# Patient Record
Sex: MALE | Race: BLACK OR AFRICAN AMERICAN | NOT HISPANIC OR LATINO | URBAN - METROPOLITAN AREA
[De-identification: names, ages, dates, MRNs, and addresses within clinical notes are randomized per-mention and may not be internally consistent; named-entity substitution may affect disease eponyms.]

---

## 2019-05-08 ENCOUNTER — EMERGENCY (EMERGENCY)
Facility: HOSPITAL | Age: 44
LOS: 1 days | Discharge: HOME | End: 2019-05-08
Attending: EMERGENCY MEDICINE
Payer: SELF-PAY

## 2019-05-08 VITALS
TEMPERATURE: 99 F | SYSTOLIC BLOOD PRESSURE: 168 MMHG | OXYGEN SATURATION: 94 % | DIASTOLIC BLOOD PRESSURE: 88 MMHG | RESPIRATION RATE: 18 BRPM | HEART RATE: 104 BPM

## 2019-05-08 VITALS — HEIGHT: 69 IN | WEIGHT: 289.91 LBS

## 2019-05-08 DIAGNOSIS — S82.90XA UNSPECIFIED FRACTURE OF UNSPECIFIED LOWER LEG, INITIAL ENCOUNTER FOR CLOSED FRACTURE: Chronic | ICD-10-CM

## 2019-05-08 DIAGNOSIS — T14.8XXA OTHER INJURY OF UNSPECIFIED BODY REGION, INITIAL ENCOUNTER: ICD-10-CM

## 2019-05-08 LAB
ALBUMIN SERPL ELPH-MCNC: 4 G/DL — SIGNIFICANT CHANGE UP (ref 3.3–5.2)
ALP SERPL-CCNC: 64 U/L — SIGNIFICANT CHANGE UP (ref 40–120)
ALT FLD-CCNC: 23 U/L — SIGNIFICANT CHANGE UP
ANION GAP SERPL CALC-SCNC: 14 MMOL/L — SIGNIFICANT CHANGE UP (ref 5–17)
APTT BLD: 28.2 SEC — SIGNIFICANT CHANGE UP (ref 27.5–36.3)
AST SERPL-CCNC: 23 U/L — SIGNIFICANT CHANGE UP
BASE EXCESS BLDV CALC-SCNC: 4.8 MMOL/L — HIGH (ref -2–2)
BASOPHILS # BLD AUTO: 0 K/UL — SIGNIFICANT CHANGE UP (ref 0–0.2)
BASOPHILS NFR BLD AUTO: 0.3 % — SIGNIFICANT CHANGE UP (ref 0–2)
BILIRUB SERPL-MCNC: 0.3 MG/DL — LOW (ref 0.4–2)
BUN SERPL-MCNC: 17 MG/DL — SIGNIFICANT CHANGE UP (ref 8–20)
CA-I SERPL-SCNC: 1.15 MMOL/L — SIGNIFICANT CHANGE UP (ref 1.15–1.33)
CALCIUM SERPL-MCNC: 9.6 MG/DL — SIGNIFICANT CHANGE UP (ref 8.6–10.2)
CHLORIDE BLDV-SCNC: 103 MMOL/L — SIGNIFICANT CHANGE UP (ref 98–107)
CHLORIDE SERPL-SCNC: 100 MMOL/L — SIGNIFICANT CHANGE UP (ref 98–107)
CO2 SERPL-SCNC: 25 MMOL/L — SIGNIFICANT CHANGE UP (ref 22–29)
CREAT SERPL-MCNC: 1.29 MG/DL — SIGNIFICANT CHANGE UP (ref 0.5–1.3)
EOSINOPHIL # BLD AUTO: 0.3 K/UL — SIGNIFICANT CHANGE UP (ref 0–0.5)
EOSINOPHIL NFR BLD AUTO: 2.8 % — SIGNIFICANT CHANGE UP (ref 0–6)
ETHANOL SERPL-MCNC: <10 MG/DL — SIGNIFICANT CHANGE UP
GAS PNL BLDV: 142 MMOL/L — SIGNIFICANT CHANGE UP (ref 135–145)
GAS PNL BLDV: SIGNIFICANT CHANGE UP
GAS PNL BLDV: SIGNIFICANT CHANGE UP
GLUCOSE BLDV-MCNC: 128 MG/DL — HIGH (ref 70–99)
GLUCOSE SERPL-MCNC: 136 MG/DL — HIGH (ref 70–115)
HCO3 BLDV-SCNC: 28 MMOL/L — SIGNIFICANT CHANGE UP (ref 21–29)
HCT VFR BLD CALC: 44.9 % — SIGNIFICANT CHANGE UP (ref 42–52)
HCT VFR BLDA CALC: 48 — SIGNIFICANT CHANGE UP (ref 39–50)
HGB BLD CALC-MCNC: 15.6 G/DL — SIGNIFICANT CHANGE UP (ref 13–17)
HGB BLD-MCNC: 15.4 G/DL — SIGNIFICANT CHANGE UP (ref 14–18)
INR BLD: 0.89 RATIO — SIGNIFICANT CHANGE UP (ref 0.88–1.16)
LACTATE BLDV-MCNC: 1.5 MMOL/L — SIGNIFICANT CHANGE UP (ref 0.5–2)
LIDOCAIN IGE QN: 29 U/L — SIGNIFICANT CHANGE UP (ref 22–51)
LYMPHOCYTES # BLD AUTO: 3.8 K/UL — SIGNIFICANT CHANGE UP (ref 1–4.8)
LYMPHOCYTES # BLD AUTO: 39.7 % — SIGNIFICANT CHANGE UP (ref 20–55)
MCHC RBC-ENTMCNC: 28.6 PG — SIGNIFICANT CHANGE UP (ref 27–31)
MCHC RBC-ENTMCNC: 34.3 G/DL — SIGNIFICANT CHANGE UP (ref 32–36)
MCV RBC AUTO: 83.5 FL — SIGNIFICANT CHANGE UP (ref 80–94)
MONOCYTES # BLD AUTO: 0.8 K/UL — SIGNIFICANT CHANGE UP (ref 0–0.8)
MONOCYTES NFR BLD AUTO: 8.4 % — SIGNIFICANT CHANGE UP (ref 3–10)
NEUTROPHILS # BLD AUTO: 4.6 K/UL — SIGNIFICANT CHANGE UP (ref 1.8–8)
NEUTROPHILS NFR BLD AUTO: 48.6 % — SIGNIFICANT CHANGE UP (ref 37–73)
OTHER CELLS CSF MANUAL: 19 ML/DL — SIGNIFICANT CHANGE UP (ref 18–22)
PCO2 BLDV: 50 MMHG — SIGNIFICANT CHANGE UP (ref 35–50)
PH BLDV: 7.4 — SIGNIFICANT CHANGE UP (ref 7.32–7.43)
PLATELET # BLD AUTO: 183 K/UL — SIGNIFICANT CHANGE UP (ref 150–400)
PO2 BLDV: 59 MMHG — HIGH (ref 25–45)
POTASSIUM BLDV-SCNC: 3.9 MMOL/L — SIGNIFICANT CHANGE UP (ref 3.4–4.5)
POTASSIUM SERPL-MCNC: 3.9 MMOL/L — SIGNIFICANT CHANGE UP (ref 3.5–5.3)
POTASSIUM SERPL-SCNC: 3.9 MMOL/L — SIGNIFICANT CHANGE UP (ref 3.5–5.3)
PROT SERPL-MCNC: 7.3 G/DL — SIGNIFICANT CHANGE UP (ref 6.6–8.7)
PROTHROM AB SERPL-ACNC: 10.2 SEC — SIGNIFICANT CHANGE UP (ref 10–12.9)
RBC # BLD: 5.38 M/UL — SIGNIFICANT CHANGE UP (ref 4.6–6.2)
RBC # FLD: 14.3 % — SIGNIFICANT CHANGE UP (ref 11–15.6)
SAO2 % BLDV: 90 % — SIGNIFICANT CHANGE UP
SODIUM SERPL-SCNC: 139 MMOL/L — SIGNIFICANT CHANGE UP (ref 135–145)
WBC # BLD: 9.6 K/UL — SIGNIFICANT CHANGE UP (ref 4.8–10.8)
WBC # FLD AUTO: 9.6 K/UL — SIGNIFICANT CHANGE UP (ref 4.8–10.8)

## 2019-05-08 PROCEDURE — 71045 X-RAY EXAM CHEST 1 VIEW: CPT

## 2019-05-08 PROCEDURE — 99284 EMERGENCY DEPT VISIT MOD MDM: CPT | Mod: 25

## 2019-05-08 PROCEDURE — 85610 PROTHROMBIN TIME: CPT

## 2019-05-08 PROCEDURE — 85730 THROMBOPLASTIN TIME PARTIAL: CPT

## 2019-05-08 PROCEDURE — 83605 ASSAY OF LACTIC ACID: CPT

## 2019-05-08 PROCEDURE — 82330 ASSAY OF CALCIUM: CPT

## 2019-05-08 PROCEDURE — 82947 ASSAY GLUCOSE BLOOD QUANT: CPT

## 2019-05-08 PROCEDURE — 82435 ASSAY OF BLOOD CHLORIDE: CPT

## 2019-05-08 PROCEDURE — 83690 ASSAY OF LIPASE: CPT

## 2019-05-08 PROCEDURE — 85014 HEMATOCRIT: CPT

## 2019-05-08 PROCEDURE — 99285 EMERGENCY DEPT VISIT HI MDM: CPT | Mod: 25

## 2019-05-08 PROCEDURE — 71045 X-RAY EXAM CHEST 1 VIEW: CPT | Mod: 26

## 2019-05-08 PROCEDURE — 36415 COLL VENOUS BLD VENIPUNCTURE: CPT

## 2019-05-08 PROCEDURE — 85027 COMPLETE CBC AUTOMATED: CPT

## 2019-05-08 PROCEDURE — 84295 ASSAY OF SERUM SODIUM: CPT

## 2019-05-08 PROCEDURE — 80307 DRUG TEST PRSMV CHEM ANLYZR: CPT

## 2019-05-08 PROCEDURE — 99053 MED SERV 10PM-8AM 24 HR FAC: CPT

## 2019-05-08 PROCEDURE — 80053 COMPREHEN METABOLIC PANEL: CPT

## 2019-05-08 PROCEDURE — 82803 BLOOD GASES ANY COMBINATION: CPT

## 2019-05-08 PROCEDURE — 96374 THER/PROPH/DIAG INJ IV PUSH: CPT | Mod: XU

## 2019-05-08 PROCEDURE — 84132 ASSAY OF SERUM POTASSIUM: CPT

## 2019-05-08 PROCEDURE — 12002 RPR S/N/AX/GEN/TRNK2.6-7.5CM: CPT

## 2019-05-08 RX ORDER — SODIUM CHLORIDE 9 MG/ML
1000 INJECTION, SOLUTION INTRAVENOUS ONCE
Qty: 0 | Refills: 0 | Status: COMPLETED | OUTPATIENT
Start: 2019-05-08 | End: 2019-05-08

## 2019-05-08 RX ORDER — CEFAZOLIN SODIUM 1 G
2 VIAL (EA) INJECTION ONCE
Qty: 0 | Refills: 0 | Status: COMPLETED | OUTPATIENT
Start: 2019-05-08 | End: 2019-05-08

## 2019-05-08 RX ADMIN — Medication 100 MILLIGRAM(S): at 01:15

## 2019-05-08 RX ADMIN — SODIUM CHLORIDE 2000 MILLILITER(S): 9 INJECTION, SOLUTION INTRAVENOUS at 01:17
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
## 2019-05-08 NOTE — H&P ADULT - RS GEN PE MLT RESP DETAILS PC
no rhonchi/no subcutaneous emphysema/good air movement/no chest wall tenderness/no intercostal retractions/airway patent/no rales/breath sounds equal/respirations non-labored/clear to auscultation bilaterally

## 2019-05-08 NOTE — ED PROVIDER NOTE - CLINICAL SUMMARY MEDICAL DECISION MAKING FREE TEXT BOX
neurovasc intact wound repaired by trauma service advised f.u in surg clinin one week for suture removal . return to ed for new onset motor or sensory deficits
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
## 2019-05-08 NOTE — CHART NOTE - NSCHARTNOTEFT_GEN_A_CORE
Code trauma A downgraded to Code Trauma B; istat ran, no additional respiratory intervention needed.
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
## 2020-01-22 ENCOUNTER — EMERGENCY (EMERGENCY)
Facility: HOSPITAL | Age: 45
LOS: 1 days | Discharge: HOME | End: 2020-01-22
Attending: EMERGENCY MEDICINE
Payer: SELF-PAY

## 2020-01-22 VITALS
TEMPERATURE: 98 F | RESPIRATION RATE: 18 BRPM | HEART RATE: 69 BPM | HEIGHT: 70 IN | WEIGHT: 279.99 LBS | DIASTOLIC BLOOD PRESSURE: 93 MMHG | SYSTOLIC BLOOD PRESSURE: 144 MMHG | OXYGEN SATURATION: 99 %

## 2020-01-22 DIAGNOSIS — S82.90XA UNSPECIFIED FRACTURE OF UNSPECIFIED LOWER LEG, INITIAL ENCOUNTER FOR CLOSED FRACTURE: Chronic | ICD-10-CM

## 2020-01-22 PROCEDURE — 99283 EMERGENCY DEPT VISIT LOW MDM: CPT

## 2020-01-22 PROCEDURE — 73630 X-RAY EXAM OF FOOT: CPT | Mod: 26,RT

## 2020-01-22 PROCEDURE — 73630 X-RAY EXAM OF FOOT: CPT

## 2020-01-22 RX ORDER — IBUPROFEN 200 MG
800 TABLET ORAL ONCE
Refills: 0 | Status: COMPLETED | OUTPATIENT
Start: 2020-01-22 | End: 2020-01-22

## 2020-01-22 NOTE — ED ADULT TRIAGE NOTE - CHIEF COMPLAINT QUOTE
Pt states "I think I fractured my right foot." Pt complains of pain at arch of R foot x1 week, worsening and moving to entire foot today, pt noticed swelling to R foot as well. Pt able to ambulate without difficulty.

## 2020-01-22 NOTE — ED PROVIDER NOTE - NS ED ROS FT
Gen: denies fever, chills, fatigue, weight loss  Skin: denies rashes, laceration, bruising  HEENT: denies visual changes, ear pain, nasal congestion, throat pain  Respiratory: denies MERIDA, SOB, cough, wheezing  Cardiovascular: denies chest pain, palpitations, diaphoresis, LE edema  MSK: +Right foot pain. denies joint swelling, back pain, neck pain  Neuro: denies headache, dizziness, weakness, numbness

## 2020-01-22 NOTE — ED PROVIDER NOTE - NSFOLLOWUPINSTRUCTIONS_ED_ALL_ED_FT
- Follow up with your doctor within 2-3 days.   - Return to the ED for any new or worsening symptoms.   - Continue to use ace wrap  - May take ibuprofen 600mg every 6 hours as needed for pain control  - May follow-up with podiatry doctor if symptoms persist.     Sprain    A sprain is a stretch or tear in one of the tough, fiber-like tissues (ligaments) in your body. This is caused by an injury to the area such as a twisting mechanism. Symptoms include pain, swelling, or bruising. Rest that area over the next several days and slowly resume activity when tolerated. Ice can help with swelling and pain.     SEEK IMMEDIATE MEDICAL CARE IF YOU HAVE ANY OF THE FOLLOWING SYMPTOMS: worsening pain, inability to move that body part, numbness or tingling.

## 2020-01-22 NOTE — ED PROVIDER NOTE - PHYSICAL EXAMINATION
Const: Awake, alert and oriented. In no acute distress. Well appearing.  HEENT: NC/AT. Moist mucous membranes.  Eyes: No scleral icterus. EOMI.  Cardiac: Regular rate and regular rhythm. +S1/S2. Peripheral pulses 2+ and symmetric. No LE edema.  Resp: Speaking in full sentences. No evidence of respiratory distress. No wheezes, rales or rhonchi.  MSK: No obvious deformity. FROM extremities x 4. Mild ttp along medial aspect of right foot and over navicular. DP and PT pulses intact. Cap refill < 2sec. Pt FWB and ambulatory with steady gait.  Skin: No rashes, erythema, warmth, abrasions or lacerations.  Neuro: Awake, alert & oriented x 3. Moves all extremities symmetrically.

## 2020-01-22 NOTE — ED PROVIDER NOTE - PROGRESS NOTE DETAILS
xray reviewed by ed attending and myself, no fx noted. Pt encouraged to use ibuprofen and/or tylenol as needed for pain control. Pt educated on RICE measures for pain relief including rest, ice applied to afected area, compression of area with ace wrap and elevation of extremity above heart level. Pt provided with referral for podiatry doctor and instructed to follow-up within 1-2 weeks if symptoms persist.

## 2020-01-22 NOTE — ED PROVIDER NOTE - OBJECTIVE STATEMENT
45yo male pmhx HTN presents to ED for right foot pain x 1 week. Pt states he was jumping rope with his kids approx 1.5 weeks ago and noted pain a few days later. Pain is located medial aspect of right foot, worse with weight bearing and ambulating. Pt has not been taking medications for symptoms. Pt "wants to make sure its not broken". Pt has been wearing ace wrap for compression. No further complaints. Denies fever, chills, joint swelling, erythema, knee pain, calf pain/swelling.

## 2020-01-22 NOTE — ED PROVIDER NOTE - PATIENT PORTAL LINK FT
You can access the FollowMyHealth Patient Portal offered by [facility] by registering at the following website: [email or web address]. By joining [employer]’s FollowMyHealth portal, you will also be able to view your health information using other applications (apps) compatible with our system.

## 2020-01-22 NOTE — ED PROVIDER NOTE - ATTENDING CONTRIBUTION TO CARE
Kevin: I performed a face to face bedside interview with patient regarding history of present illness, review of symptoms and past medical history. I completed an independent physical exam.  I have discussed patient's plan of care with advanced care provider.   I agree with note as stated above including HISTORY OF PRESENT ILLNESS, HIV, PAST MEDICAL/SURGICAL/FAMILY/SOCIAL HISTORY, ALLERGIES AND HOME MEDICATIONS, REVIEW OF SYSTEMS, PHYSICAL EXAM, MEDICAL DECISION MAKING and any PROGRESS NOTES during the time I functioned as the attending physician for this patient  unless otherwise noted. My brief assessment is as follows: pt with 1 week of right foot pain near medial malleolus  wrapping around to top of foot, happened while playing with kid, pt turned foot. neurovasuclalry intact. no other injury. xray without acute fracture, supportive care. podiatry.

## 2020-01-22 NOTE — ED PROVIDER NOTE - CARE PROVIDER_API CALL
Rufus Dumont (DPM)  Podiatric Medicine and Surgery  [address] [place] [address]  Phone: [phone]  Fax: [phone]  Follow Up Time:

## 2021-07-10 ENCOUNTER — EMERGENCY (EMERGENCY)
Facility: HOSPITAL | Age: 46
LOS: 1 days | Discharge: HOME | End: 2021-07-10
Attending: EMERGENCY MEDICINE
Payer: MEDICAID

## 2021-07-10 VITALS
TEMPERATURE: 98 F | RESPIRATION RATE: 18 BRPM | OXYGEN SATURATION: 94 % | DIASTOLIC BLOOD PRESSURE: 62 MMHG | SYSTOLIC BLOOD PRESSURE: 119 MMHG | HEIGHT: 70 IN | HEART RATE: 58 BPM

## 2021-07-10 VITALS
DIASTOLIC BLOOD PRESSURE: 74 MMHG | RESPIRATION RATE: 18 BRPM | HEART RATE: 57 BPM | TEMPERATURE: 98 F | SYSTOLIC BLOOD PRESSURE: 110 MMHG | OXYGEN SATURATION: 96 %

## 2021-07-10 DIAGNOSIS — S82.90XA UNSPECIFIED FRACTURE OF UNSPECIFIED LOWER LEG, INITIAL ENCOUNTER FOR CLOSED FRACTURE: Chronic | ICD-10-CM

## 2021-07-10 LAB
ALBUMIN SERPL ELPH-MCNC: 3.7 G/DL — SIGNIFICANT CHANGE UP (ref 3.3–5.2)
ALP SERPL-CCNC: 56 U/L — SIGNIFICANT CHANGE UP (ref 40–120)
ALT FLD-CCNC: 19 U/L — SIGNIFICANT CHANGE UP
ANION GAP SERPL CALC-SCNC: 11 MMOL/L — SIGNIFICANT CHANGE UP (ref 5–17)
AST SERPL-CCNC: 20 U/L — SIGNIFICANT CHANGE UP
BASOPHILS # BLD AUTO: 0.06 K/UL — SIGNIFICANT CHANGE UP (ref 0–0.2)
BASOPHILS NFR BLD AUTO: 1.1 % — SIGNIFICANT CHANGE UP (ref 0–2)
BILIRUB SERPL-MCNC: 0.5 MG/DL — SIGNIFICANT CHANGE UP (ref 0.4–2)
BUN SERPL-MCNC: 9.4 MG/DL — SIGNIFICANT CHANGE UP (ref 8–20)
CALCIUM SERPL-MCNC: 9 MG/DL — SIGNIFICANT CHANGE UP (ref 8.6–10.2)
CHLORIDE SERPL-SCNC: 102 MMOL/L — SIGNIFICANT CHANGE UP (ref 98–107)
CO2 SERPL-SCNC: 25 MMOL/L — SIGNIFICANT CHANGE UP (ref 22–29)
CREAT SERPL-MCNC: 1.01 MG/DL — SIGNIFICANT CHANGE UP (ref 0.5–1.3)
D DIMER BLD IA.RAPID-MCNC: <150 NG/ML DDU — SIGNIFICANT CHANGE UP
EOSINOPHIL # BLD AUTO: 0.33 K/UL — SIGNIFICANT CHANGE UP (ref 0–0.5)
EOSINOPHIL NFR BLD AUTO: 6.1 % — HIGH (ref 0–6)
GLUCOSE SERPL-MCNC: 112 MG/DL — HIGH (ref 70–99)
HCT VFR BLD CALC: 41.3 % — SIGNIFICANT CHANGE UP (ref 39–50)
HGB BLD-MCNC: 13.7 G/DL — SIGNIFICANT CHANGE UP (ref 13–17)
IMM GRANULOCYTES NFR BLD AUTO: 0.6 % — SIGNIFICANT CHANGE UP (ref 0–1.5)
LYMPHOCYTES # BLD AUTO: 1.87 K/UL — SIGNIFICANT CHANGE UP (ref 1–3.3)
LYMPHOCYTES # BLD AUTO: 34.8 % — SIGNIFICANT CHANGE UP (ref 13–44)
MAGNESIUM SERPL-MCNC: 2.1 MG/DL — SIGNIFICANT CHANGE UP (ref 1.6–2.6)
MCHC RBC-ENTMCNC: 28.7 PG — SIGNIFICANT CHANGE UP (ref 27–34)
MCHC RBC-ENTMCNC: 33.2 GM/DL — SIGNIFICANT CHANGE UP (ref 32–36)
MCV RBC AUTO: 86.6 FL — SIGNIFICANT CHANGE UP (ref 80–100)
MONOCYTES # BLD AUTO: 0.54 K/UL — SIGNIFICANT CHANGE UP (ref 0–0.9)
MONOCYTES NFR BLD AUTO: 10 % — SIGNIFICANT CHANGE UP (ref 2–14)
NEUTROPHILS # BLD AUTO: 2.55 K/UL — SIGNIFICANT CHANGE UP (ref 1.8–7.4)
NEUTROPHILS NFR BLD AUTO: 47.4 % — SIGNIFICANT CHANGE UP (ref 43–77)
NT-PROBNP SERPL-SCNC: 9 PG/ML — SIGNIFICANT CHANGE UP (ref 0–300)
PLATELET # BLD AUTO: 159 K/UL — SIGNIFICANT CHANGE UP (ref 150–400)
POTASSIUM SERPL-MCNC: 3.5 MMOL/L — SIGNIFICANT CHANGE UP (ref 3.5–5.3)
POTASSIUM SERPL-SCNC: 3.5 MMOL/L — SIGNIFICANT CHANGE UP (ref 3.5–5.3)
PROT SERPL-MCNC: 6.2 G/DL — LOW (ref 6.6–8.7)
RBC # BLD: 4.77 M/UL — SIGNIFICANT CHANGE UP (ref 4.2–5.8)
RBC # FLD: 14.1 % — SIGNIFICANT CHANGE UP (ref 10.3–14.5)
SODIUM SERPL-SCNC: 138 MMOL/L — SIGNIFICANT CHANGE UP (ref 135–145)
TROPONIN T SERPL-MCNC: <0.01 NG/ML — SIGNIFICANT CHANGE UP (ref 0–0.06)
WBC # BLD: 5.38 K/UL — SIGNIFICANT CHANGE UP (ref 3.8–10.5)
WBC # FLD AUTO: 5.38 K/UL — SIGNIFICANT CHANGE UP (ref 3.8–10.5)

## 2021-07-10 PROCEDURE — 93005 ELECTROCARDIOGRAM TRACING: CPT

## 2021-07-10 PROCEDURE — 85379 FIBRIN DEGRADATION QUANT: CPT

## 2021-07-10 PROCEDURE — 93010 ELECTROCARDIOGRAM REPORT: CPT | Mod: 76

## 2021-07-10 PROCEDURE — 94640 AIRWAY INHALATION TREATMENT: CPT

## 2021-07-10 PROCEDURE — 71045 X-RAY EXAM CHEST 1 VIEW: CPT

## 2021-07-10 PROCEDURE — 83735 ASSAY OF MAGNESIUM: CPT

## 2021-07-10 PROCEDURE — C8929: CPT

## 2021-07-10 PROCEDURE — 71045 X-RAY EXAM CHEST 1 VIEW: CPT | Mod: 26

## 2021-07-10 PROCEDURE — 99283 EMERGENCY DEPT VISIT LOW MDM: CPT

## 2021-07-10 PROCEDURE — 80053 COMPREHEN METABOLIC PANEL: CPT

## 2021-07-10 PROCEDURE — 85025 COMPLETE CBC W/AUTO DIFF WBC: CPT

## 2021-07-10 PROCEDURE — 99285 EMERGENCY DEPT VISIT HI MDM: CPT

## 2021-07-10 PROCEDURE — 84484 ASSAY OF TROPONIN QUANT: CPT

## 2021-07-10 PROCEDURE — 83880 ASSAY OF NATRIURETIC PEPTIDE: CPT

## 2021-07-10 PROCEDURE — 36415 COLL VENOUS BLD VENIPUNCTURE: CPT

## 2021-07-10 PROCEDURE — 99284 EMERGENCY DEPT VISIT MOD MDM: CPT | Mod: 25

## 2021-07-10 RX ORDER — ASPIRIN/CALCIUM CARB/MAGNESIUM 324 MG
325 TABLET ORAL DAILY
Refills: 0 | Status: DISCONTINUED | OUTPATIENT
Start: 2021-07-10 | End: 2021-07-14

## 2021-07-10 RX ORDER — IPRATROPIUM/ALBUTEROL SULFATE 18-103MCG
3 AEROSOL WITH ADAPTER (GRAM) INHALATION ONCE
Refills: 0 | Status: COMPLETED | OUTPATIENT
Start: 2021-07-10 | End: 2021-07-10

## 2021-07-10 RX ADMIN — Medication 3 MILLILITER(S): at 07:46

## 2021-07-10 RX ADMIN — Medication 325 MILLIGRAM(S): at 06:35

## 2021-07-10 NOTE — ED PROVIDER NOTE - CLINICAL SUMMARY MEDICAL DECISION MAKING FREE TEXT BOX
Pt presenting with leg swelling, wheezing, arrhythmia, and reported sob. Will evaluate with labwork, cxr. EKGs showing patient is in and out of Mobitz 1. Cardiology consult placed for AM.

## 2021-07-10 NOTE — ED PROVIDER NOTE - CARE PROVIDER_API CALL
Alxe Hallman (DO)  Cardiology; Internal Medicine  [address] [facility], [place] [address]  Phone: [phone]  Fax: [phone]  Follow Up Time:

## 2021-07-10 NOTE — ED PROVIDER NOTE - PATIENT PORTAL LINK FT
You can access the FollowMyHealth Patient Portal offered by [facility] by registering at the following website: [email or web address]. By joining [employer]’s FollowMyHealth portal, you will also be able to view your health information using other applications (apps) compatible with our system. You can access the FollowMyHealth Patient Portal offered by [facility] by registering at the following website: [email or web address]. By joining [employer]’s FollowMyHealth portal, you will also be able to view your health information using other applications (apps) compatible with our system.

## 2021-07-10 NOTE — ED PROVIDER NOTE - PROGRESS NOTE DETAILS
AJM: Pt received in sign out. seen by cards and cleared for dc with outpatient follow up. All results discussed with the patient, and a copy of results has been provided. Patient is comfortable with dc plan for home. Opportunity for questions was provided and all questions have been addressed. Patient understands when to return to ED if symptoms worsen.

## 2021-07-10 NOTE — ED ADULT NURSE REASSESSMENT NOTE - NS ED NURSE REASSESS COMMENT FT1
pt hemodynamically stable, PIV removed, refer to flowsheet and chart, pt to be discharged, pt understood discharge instructions and plan of care. Pt medically cleared by MD Miguel.
verbal cues/nonverbal cues (demo/gestures)/1 person assist

## 2021-07-10 NOTE — ED PROVIDER NOTE - NS ED ROS FT
General: Denies fever, chills  HEENT: Denies visual changes, sore throat  Neck: Denies neck pain, neck stiffness  Resp: Endorses SOB  Cardiovascular: Denies CP, palpitations, LE edema  GI: Denies abdominal pain, nausea, vomiting, diarrhea  : Denies dysuria, hematuria, incontinence  MSK: Denies back pain  Neuro: Denies HA, dizziness, numbness, weakness  Skin: Denies rashes

## 2021-07-10 NOTE — CONSULT NOTE ADULT - ATTENDING COMMENTS
46M h/o obesity, reported valvular regurgitation last seen cardiologist >5 years ago p/w acute dyspnea awoke from sleep, denies any chest pain, EKGs found incidental 1st degree and Mobitz I, no prior syncope, baseline active with exercise, reports chronic dyspnea for several years, intermittent LE swelling worst on dependent standing/sitting.   -TTE with normal LV systolic function/EF, no significant valvular abnormality, mildly dilated RV would check D-dimer to exclude PE, if negative suspect due to undiagnosed RENETTA, would need outpatient sleep study and also Event monitor.  -can follow up outpatient in 2 weeks.           Alex Hallman DO, [facility]  Faculty Non-Invasive Cardiologist  [phone]

## 2021-07-10 NOTE — ED PROVIDER NOTE - OBJECTIVE STATEMENT
Pt is a 45yo M presenting with SOB. He states that he was sleeping when he awoke with the sensation of not being able to breathe. He reports that his symptoms subsided but he was concerned and came to [facility]. He states that he has a history of some cardiac disease, last saw a cardiologist in [place] about 6 years ago, but he is unsure of what his diagnoses were. He reports that he is supposed to be on HTN medications but is not. He states that his legs have been getting swollen over the last few weeks as well. He denies fevers, chills, chest pain, nausea, vomiting, diarrhea.

## 2021-07-10 NOTE — CONSULT NOTE ADULT - SUBJECTIVE AND OBJECTIVE BOX
[facility]                                                               Office:  [address] [address]                                                              Telephone: [phone]. Fax:[phone]                                                                        CARDIOLOGY CONSULTATION NOTE                                                                                             Consult requested by:  Natalie  Reason for Consultation: shortness of breath  History obtained by: Patient and medical record   obtained: No  Cardiologist: none    Chief complaint:    Patient is a 46y old  Male who presents with a chief complaint of shortness of breath      HPI: 47 y/o M with PMH ?valvular insufficiency presents to ED with c/o shortness of breath. Pt states fell asleep on couch last night around 3 am, woke this morning with shortness of breath. In ED b/l expiratory wheezing on exam, given nebulizer treatment. EKG show Mobitz I. Pt states saw cardiologist approx 6 years ago for ? murmur, valve issue. Pt can not recall diagnosis, states R side of the heart, not closing all the way. Denies fever, chills, cough, phlegm production, orthopnea, PND, edema, chest pain, pressure, irregular, fast heart beat, nausea, vomiting, melena, rectal bleed, hematuria, lightheadedness, dizziness, syncope, near syncope.    REVIEW OF SYMPTOMS:   CONSTITUTIONAL: No fever, no weight loss, no fatigue, no weight gain   ENMT:  No difficulty hearing, tinnitus, vertigo; No sinus or throat pain  NECK: No pain or stiffness  CARDIOVASCULAR: No chest pain, no dyspnea, no syncope, + palpitations, no dizziness, no Orthopnea, no Paroxsymal nocturnal dyspnea  RESPIRATORY: No Dyspnea on exertion, + Shortness of breath, no cough, no wheezing  : No dysuria, no hematuria   GI: No dark color stool, no melena, no diarrhea, no constipation, no abdominal pain   NEURO: No headache, no dizziness, no slurred speech   MUSCULOSKELETAL: No joint pain or swelling; No muscle, back, or extremity pain  PSYCH: No agitation, no anxiety.    ALL OTHER REVIEW OF SYSTEMS ARE NEGATIVE.      ALLERGIES: Allergies  No Known Allergies  Intolerances        PAST MEDICAL HISTORY  No pertinent past medical history      PAST SURGICAL HISTORY  Leg fracture      FAMILY HISTORY:  Mother-  overdose  Father-  Cancer  6 sisters- unknown medical issues     SOCIAL HISTORY:  Works as a belle   CIGARETTES:   Former smoker   ALCOHOL: social (5 shots last night)  DRUGS: Marijuana       CURRENT MEDICATIONS:   aspirin      HOME MEDICATIONS:      Vital Signs Last 24 Hrs  T(C): 36.6 (10 Jul 2021 07:13), Max: 36.6 (10 Jul 2021 05:46)  T(F): 97.8 (10 Jul 2021 07:13), Max: 97.8 (10 Jul 2021 05:46)  HR: 68 (10 Jul 2021 07:13) (58 - 68)  BP: 113/58 (10 Jul 2021 07:13) (113/58 - 119/62)  RR: 18 (10 Jul 2021 05:46) (18 - 18)  SpO2: 94% (10 Jul 2021 05:46) (94% - 94%)      PHYSICAL EXAM:  Constitutional: Comfortable . No acute distress.   HEENT: Atraumatic and normocephalic , neck is supple . no JVD.   CNS: A&Ox3. No focal deficits. EOMI.   Respiratory: CTAB, unlabored   Cardiovascular: S1S2 RRR. No murmur/rubs or gallop.  Gastrointestinal: Soft non-tender and non distended . +Bowel sounds.   Extremities: trace edema.   Psychiatric: Calm . no agitation.  Skin: No skin rash/ulcers visualized to face, hands or feet.      LABS:                        13.7   5.38  )-----------( 159      ( 10 Jul 2021 06:40 )             41.3     07-10    138  |  102  |  9.4  ----------------------------<  112<H>  3.5   |  25.0  |  1.01    Ca    9.0      10 Jul 2021 06:40  Mg     2.1     07-10    TPro  6.2<L>  /  Alb  3.7  /  TBili  0.5  /  DBili  x   /  AST  20  /  ALT  19  /  AlkPhos  56  07-10    CARDIAC MARKERS ( 10 Jul 2021 06:40 )  x     / <0.01 ng/mL / x     / x     / x        ;p-BNP=Serum Pro-Brain Natriuretic Peptide: 9 pg/mL (10 @ 06:40)      INTERPRETATION OF TELEMETRY: Reviewed by me.   ECG: SR, mobmars I; repeat SR, 1st degree HB      RADIOLOGY & ADDITIONAL STUDIES:    X-ray:  normal

## 2021-07-10 NOTE — CONSULT NOTE ADULT - ASSESSMENT
45 y/o M with PMH ?valvular insufficiency presents to ED with c/o shortness of breath. Pt states fell asleep on couch last night around 3 am, woke this morning with shortness of breath. In ED b/l expiratory wheezing on exam, given nebulizer treatment. EKG show Mobitz I. Pt states saw cardiologist approx 6 years ago for ? murmur, valve issue. Pt can not recall diagnosis, states R side of the heart, not closing all the way.    SHortness of breath  - wheeze on presentation  - CXR clear, no pleural effusion, no pulm congestion  - ?history valve issue  - TTE today    Heart Block  - EKG Mobitz I, 1 st degree.   - [facility] as out patient

## 2021-07-10 NOTE — ED PROVIDER NOTE - PHYSICAL EXAMINATION
General: Well appearing male in no acute distress  HEENT: Normocephalic, atraumatic. Moist mucous membranes.   Eyes: No scleral icterus. No conjunctival pallor. EOMI. ALLISON.  Neck: Soft and supple. Full ROM without pain. No midline tenderness  Cardiac: Regular rate and irregular rhythm. No murmurs. 1+ LE edema  Resp: Lungs expiratory wheezing bilaterally  Abd: Soft, non-tender, non-distended. No guarding or rebound.   Back: Spine midline and non-tender. No CVA tenderness.    Skin: No rashes, abrasions, or lacerations.

## 2021-07-10 NOTE — ED ADULT NURSE NOTE - OBJECTIVE STATEMENT
Pt AAOX4, Pt c/o SOB, pt states that he was sleeping when he awoke with the sensation of not being able to breathe, pt states he got lightheaded and thought he was going to faint, pt denies chest pain, pt denies n/v/d, pt states his legs have gotten swollen over the last 2 weeks, pt respirations even and unlabored, pt able to move all extremities well

## 2021-07-10 NOTE — ED ADULT TRIAGE NOTE - CHIEF COMPLAINT QUOTE
patient states that he woke up this morning with complaints of shortness of breath, denies any complaints of pain or discomfort at this time

## 2021-07-10 NOTE — ED PROVIDER NOTE - ATTENDING CONTRIBUTION TO CARE
47 yo M presents to ED after being awoken from sleep with feeling like he couldn't breathe.  Pt noted to have 2nd degree HB Mobitz I on EKG and states she was seen by Cardiology in [place] 6 yrs ago and told he had abnormality, but lost insurance and never followed up.  Pt also c/o leg edema.  Pt denies any assoc CP, ab d pain, N/V, diaphoresis or syncope.  On exam awake and alert in NAD, PERRL, Neck 2upple, no JVD, Cor Reg, Lungs with few exp wheezes, abd soft, NT, Ext +2 LE edema, Neuro non-focal.  Will check labs, Mg, CE, BnP, CXR and consult Cardio (EP)

## 2021-07-13 ENCOUNTER — NON-APPOINTMENT (OUTPATIENT)
Age: 46
End: 2021-07-13

## 2021-07-13 ENCOUNTER — APPOINTMENT (OUTPATIENT)
Dept: CARDIOLOGY | Facility: CLINIC | Age: 46
End: 2021-07-13
Payer: MEDICAID

## 2021-07-13 VITALS — DIASTOLIC BLOOD PRESSURE: 80 MMHG | SYSTOLIC BLOOD PRESSURE: 106 MMHG

## 2021-07-13 VITALS
OXYGEN SATURATION: 98 % | DIASTOLIC BLOOD PRESSURE: 80 MMHG | HEIGHT: 70 IN | BODY MASS INDEX: 33.64 KG/M2 | WEIGHT: 235 LBS | HEART RATE: 64 BPM | TEMPERATURE: 98.1 F | SYSTOLIC BLOOD PRESSURE: 126 MMHG
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
## 2021-07-13 DIAGNOSIS — R06.02 SHORTNESS OF BREATH: ICD-10-CM

## 2021-07-13 DIAGNOSIS — Z87.898 PERSONAL HISTORY OF OTHER SPECIFIED CONDITIONS: ICD-10-CM

## 2021-07-13 DIAGNOSIS — R94.31 ABNORMAL ELECTROCARDIOGRAM [ECG] [EKG]: ICD-10-CM

## 2021-07-13 DIAGNOSIS — Z80.9 FAMILY HISTORY OF MALIGNANT NEOPLASM, UNSPECIFIED: ICD-10-CM

## 2021-07-13 DIAGNOSIS — Z78.9 OTHER SPECIFIED HEALTH STATUS: ICD-10-CM

## 2021-07-13 PROCEDURE — 93000 ELECTROCARDIOGRAM COMPLETE: CPT

## 2021-07-13 PROCEDURE — 99214 OFFICE O/P EST MOD 30 MIN: CPT

## 2021-07-13 NOTE — HISTORY OF PRESENT ILLNESS
[FreeTextEntry1] : 46M h/o obesity, reported valvular regurgitation last seen cardiologist >5 years ago presented to [facility] on 7/10/21 with acute dyspnea awoke from sleep, denies any chest pain, EKGs found incidental 1st degree and Mobitz I, no prior syncope, baseline active with exercise, reports chronic dyspnea for several years, intermittent LE swelling worst on dependent standing/sitting. TTE with normal LV systolic function/EF, no significant valvular abnormality, mildly dilated RV, negative D-dimer, suspect due to undiagnosed RENETTA, would need outpatient sleep study and also Event monitor, presents for initial outpatient cardiology visit. \par \par He continues to have episodes of dyspnea at night when waking up. Reports also exertional dyspnea for few years, no chest pain, no dizziness, recollect an episode of syncope last year for few seconds at home witnessed by his brother did not seek hospital care. \par \par Not yet schedule for COVID vaccine due to hesitancy about the vaccine being coming out too soon. \par \par No CAD or stroke\par Nonsmoker, social alcohol\par Working as a belle\par

## 2021-07-13 NOTE — HISTORY OF PRESENT ILLNESS
[FreeTextEntry1] : 46M h/o obesity, reported valvular regurgitation last seen cardiologist >5 years ago presented to [facility] on 7/10/21 with acute dyspnea awoke from sleep, denies any chest pain, EKGs found incidental 1st degree and Mobitz I, no prior syncope, baseline active with exercise, reports chronic dyspnea for several years, intermittent LE swelling worst on dependent standing/sitting. TTE with normal LV systolic function/EF, no significant valvular abnormality, mildly dilated RV, negative D-dimer, suspect due to undiagnosed RENETTA, would need outpatient sleep study and also Event monitor, presents for initial outpatient cardiology visit. \par \par Reports also exertional dyspnea for few years, no chest pain, no dizziness, recollect an episode of syncope last year for few seconds at home did not seek hospital care. \par \par \par No CAD or stroke\par Nonsmoker, social alcohol\par Working as a belle\par

## 2021-07-13 NOTE — CARDIOLOGY SUMMARY
[de-identified] : 7/13/21- Sinus 52, normal axis, Mobitz I, nonspecific T-wave, QTc 416 [de-identified] : 7/10/21- Summary:\par  1. Left ventricular ejection fraction, by visual estimation, is 55 to 60%.\par  2. Normal global left ventricular systolic function.\par  3. Moderately enlarged right ventricle.\par  4. Mildly enlarged right atrium.\par  5. Normal left atrial size.\par  6. Mild mitral annular calcification.\par  7. Thickening of the anterior and posterior mitral valve leaflets.\par  8. Trace mitral valve regurgitation.\par  9. Mild-moderate tricuspid regurgitation.

## 2021-07-13 NOTE — CARDIOLOGY SUMMARY
[de-identified] : 7/10/21- Summary:\par  1. Left ventricular ejection fraction, by visual estimation, is 55 to 60%.\par  2. Normal global left ventricular systolic function.\par  3. Moderately enlarged right ventricle.\par  4. Mildly enlarged right atrium.\par  5. Normal left atrial size.\par  6. Mild mitral annular calcification.\par  7. Thickening of the anterior and posterior mitral valve leaflets.\par  8. Trace mitral valve regurgitation.\par  9. Mild-moderate tricuspid regurgitation.

## 2021-07-13 NOTE — CARDIOLOGY SUMMARY
[de-identified] : 7/10/21- Summary:\par  1. Left ventricular ejection fraction, by visual estimation, is 55 to 60%.\par  2. Normal global left ventricular systolic function.\par  3. Moderately enlarged right ventricle.\par  4. Mildly enlarged right atrium.\par  5. Normal left atrial size.\par  6. Mild mitral annular calcification.\par  7. Thickening of the anterior and posterior mitral valve leaflets.\par  8. Trace mitral valve regurgitation.\par  9. Mild-moderate tricuspid regurgitation.

## 2021-07-13 NOTE — DISCUSSION/SUMMARY
[FreeTextEntry1] : 46M h/o obesity, reported valvular regurgitation last seen cardiologist >5 years ago presented to [facility] on 7/10/21 with acute dyspnea awoke from sleep, denies any chest pain, EKGs found incidental 1st degree and Mobitz I, no prior syncope, baseline active with exercise, reports chronic dyspnea for several years, intermittent LE swelling worst on dependent standing/sitting. TTE with normal LV systolic function/EF, no significant valvular abnormality, mildly dilated RV, negative D-dimer, suspect due to undiagnosed RENETTA, would need outpatient sleep study and also Event monitor, presents for initial outpatient cardiology visit. \par \par \par Dyspnea with mild RV dysfunction and features of sleep apnea suspect RENETTA, need sleep study, check exercise treadmill test as well with exertional dyspnea; Mobitz type I AV block will check 48hrs holter and carotid duplex with prior episode of syncope. \par \par \par 1. Dyspnea- await EST and sleep study result. \par \par 2. Mild RV dysfunction- await sleep study if indicated for CPAP. \par \par 3. Mobitz I AV block, prior syncope- pending 48hrs Holter and carotid duplex. \par \par 4. Obesity, HCM- to establish care with PMD, need lab on A1c and fasting lipid. \par \par \par Strongly advised to obtain COVID vaccination. \par \par Follow up in 2 months.

## 2021-07-13 NOTE — HISTORY OF PRESENT ILLNESS
[FreeTextEntry1] : 46M h/o obesity, reported valvular regurgitation last seen cardiologist >5 years ago presented to [facility] on 7/10/21 with acute dyspnea awoke from sleep, denies any chest pain, EKGs found incidental 1st degree and Mobitz I, no prior syncope, baseline active with exercise, reports chronic dyspnea for several years, intermittent LE swelling worst on dependent standing/sitting. TTE with normal LV systolic function/EF, no significant valvular abnormality, mildly dilated RV, negative D-dimer, suspect due to undiagnosed RENETTA, would need outpatient sleep study and also Event monitor, presents for initial outpatient cardiology visit. \par \par Reports also exertional dyspnea for few years, no chest pain, no dizziness, recollect an episode of syncope last year for few seconds at home did not seek hospital care. \par \par \par No CAD or stroke\par Nonsmoker, social alcohol\par Working as a belle\par

## 2021-07-13 NOTE — REVIEW OF SYSTEMS
[Dyspnea on exertion] : dyspnea during exertion [Chest Discomfort] : no chest discomfort [Lower Ext Edema] : no extremity edema [Leg Claudication] : no intermittent leg claudication [Negative] : Heme/Lymph

## 2021-07-29 ENCOUNTER — APPOINTMENT (OUTPATIENT)
Age: 46
End: 2021-07-29

## 2021-08-05 ENCOUNTER — NON-APPOINTMENT (OUTPATIENT)
Age: 46
End: 2021-08-05

## 2021-09-13 ENCOUNTER — APPOINTMENT (OUTPATIENT)
Dept: CARDIOLOGY | Facility: CLINIC | Age: 46
End: 2021-09-13
Payer: MEDICAID

## 2021-09-13 VITALS
HEART RATE: 70 BPM | BODY MASS INDEX: 33.64 KG/M2 | HEIGHT: 70 IN | SYSTOLIC BLOOD PRESSURE: 122 MMHG | WEIGHT: 235 LBS | OXYGEN SATURATION: 96 % | DIASTOLIC BLOOD PRESSURE: 72 MMHG

## 2021-09-13 PROCEDURE — 99213 OFFICE O/P EST LOW 20 MIN: CPT
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
## 2021-09-13 NOTE — CARDIOLOGY SUMMARY
[de-identified] : 7/13/21- Sinus 52, normal axis, Mobitz I, nonspecific T-wave, QTc 416 [de-identified] : 7/10/21- Summary:\par  1. Left ventricular ejection fraction, by visual estimation, is 55 to 60%.\par  2. Normal global left ventricular systolic function.\par  3. Moderately enlarged right ventricle.\par  4. Mildly enlarged right atrium.\par  5. Normal left atrial size.\par  6. Mild mitral annular calcification.\par  7. Thickening of the anterior and posterior mitral valve leaflets.\par  8. Trace mitral valve regurgitation.\par  9. Mild-moderate tricuspid regurgitation.

## 2021-09-13 NOTE — DISCUSSION/SUMMARY
[FreeTextEntry1] : 46M h/o obesity, reported valvular regurgitation last seen cardiologist >5 years ago presented to [facility] on 7/10/21 with acute dyspnea awoke from sleep, denies any chest pain, EKGs found incidental 1st degree and Mobitz I, no prior syncope, baseline active with exercise, reports chronic dyspnea for several years, intermittent LE swelling worst on dependent standing/sitting. TTE with normal LV systolic function/EF, no significant valvular abnormality, mildly dilated RV, negative D-dimer, suspect due to undiagnosed RENETTA, would need outpatient sleep study and also Event monitor, presents for initial outpatient cardiology visit seen on 7/2021 Holter noted asymptomatic Mobitz 1 AV block with <3 seconds pause, awaiting exercise treadmill stress test, missed sleep study appointment, presents for follow up. \par \par \par Dyspnea with mild RV dysfunction and features of sleep apnea suspect RENETTA, need to track down result of sleep study done at outside facility? Advised to schedule exercise treadmill test and repeat TTE to reassess RV size/function. . \par \par \par 1. Dyspnea- await EST and repeat TTE; pending sleep study result. \par \par 2. Mild RV dysfunction- await sleep study if indicated for CPAP. \par \par 3. Mobitz I AV block, prior syncope- pending 48hrs Holter and carotid duplex. \par \par 4. Obesity, HCM- to establish care with PMD, need lab on A1c and fasting lipid. \par \par \par Strongly advised to obtain COVID vaccination. \par \par Follow up in 3 months.

## 2021-09-20 ENCOUNTER — APPOINTMENT (OUTPATIENT)
Dept: CARDIOLOGY | Facility: CLINIC | Age: 46
End: 2021-09-20
Payer: MEDICAID

## 2021-09-20 PROCEDURE — 93880 EXTRACRANIAL BILAT STUDY: CPT

## 2021-09-20 PROCEDURE — 93306 TTE W/DOPPLER COMPLETE: CPT

## 2021-09-30 ENCOUNTER — APPOINTMENT (OUTPATIENT)
Dept: CARDIOLOGY | Facility: CLINIC | Age: 46
End: 2021-09-30
Payer: MEDICAID

## 2021-09-30 PROCEDURE — 93015 CV STRESS TEST SUPVJ I&R: CPT

## 2021-12-13 ENCOUNTER — NON-APPOINTMENT (OUTPATIENT)
Age: 46
End: 2021-12-13

## 2021-12-13 ENCOUNTER — APPOINTMENT (OUTPATIENT)
Dept: CARDIOLOGY | Facility: CLINIC | Age: 46
End: 2021-12-13
Payer: MEDICAID

## 2021-12-13 VITALS
BODY MASS INDEX: 34.54 KG/M2 | WEIGHT: 241.3 LBS | DIASTOLIC BLOOD PRESSURE: 80 MMHG | TEMPERATURE: 98.3 F | SYSTOLIC BLOOD PRESSURE: 126 MMHG | HEIGHT: 70 IN | OXYGEN SATURATION: 96 % | HEART RATE: 69 BPM

## 2021-12-13 DIAGNOSIS — I51.7 CARDIOMEGALY: ICD-10-CM

## 2021-12-13 DIAGNOSIS — R29.818 OTHER SYMPTOMS AND SIGNS INVOLVING THE NERVOUS SYSTEM: ICD-10-CM

## 2021-12-13 DIAGNOSIS — I44.1 ATRIOVENTRICULAR BLOCK, SECOND DEGREE: ICD-10-CM

## 2021-12-13 DIAGNOSIS — Z71.85 ENCOUNTER FOR IMMUNIZATION SAFETY COUNSELING: ICD-10-CM

## 2021-12-13 PROCEDURE — 99213 OFFICE O/P EST LOW 20 MIN: CPT

## 2021-12-13 PROCEDURE — 93000 ELECTROCARDIOGRAM COMPLETE: CPT

## 2021-12-13 NOTE — CARDIOLOGY SUMMARY
[de-identified] : 7/13/21- Sinus 52, normal axis, Mobitz I, nonspecific T-wave, QTc 416\par 12/13/21- Sinus 62, normal axis, 1st degree, QTc 423 [de-identified] : 9/2021: Exercise treadmill stress test for 10 minutes, no chest pain/ECG abnormality with peak  bpm [de-identified] : 7/10/21- Summary:\par  1. Left ventricular ejection fraction, by visual estimation, is 55 to 60%.\par  2. Normal global left ventricular systolic function.\par  3. Moderately enlarged right ventricle.\par  4. Mildly enlarged right atrium.\par  5. Normal left atrial size.\par  6. Mild mitral annular calcification.\par  7. Thickening of the anterior and posterior mitral valve leaflets.\par  8. Trace mitral valve regurgitation.\par  9. Mild-moderate tricuspid regurgitation.\par \par 9/2021: TTE with LV EF 55%, mild LVH, mildly enlarged RV size with PASP 28 mmHg, mild biatrial enlargement
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
## 2021-12-13 NOTE — DISCUSSION/SUMMARY
[FreeTextEntry1] : 46M h/o obesity, reported valvular regurgitation last seen cardiologist >5 years ago presented to [facility] on 7/10/21 with acute dyspnea awoke from sleep, denies any chest pain, EKGs found incidental 1st degree and Mobitz I, no prior syncope, baseline active with exercise, reports chronic dyspnea for several years, intermittent LE swelling worst on dependent standing/sitting. TTE with normal LV systolic function/EF, no significant valvular abnormality, mildly dilated RV, negative D-dimer, suspect due to undiagnosed RENETTA, would need outpatient sleep study and also Event monitor, presents for initial outpatient cardiology visit seen on 7/2021 Holter noted asymptomatic Mobitz 1 AV block with <3 seconds pause, last visit 9/2021, exercise treadmill stress test for 10 minutes, no chest pain/ECG abnormality with peak  bpm, repeat TTE with LV EF 55%, mild LVH, mildly enlarged RV size with PASP 28 mmHg, mild biatrial enlargement,presents for follow up. \par \par EKG with 1st degree AV block and prior Mobitz type 1 appear stable, mild RV dysfunction and features of sleep apnea suspect RENETTA, need to track down result of sleep study done at outside facility\par \par \par 1. Mild RV dysfunction- await sleep study result if indicated for CPAP. \par \par 2. Mobitz I AV block, prior syncope- stable, carotid Duplex without stenosis/atherosclerosis. \par \par 3. Dyspnea- resolved, exercise treadmill stress low cardiac risk. \par \par 4. Obesity, HCM- to establish care with PMD, need lab on A1c and fasting lipid. \par \par \par Strongly advised to obtain COVID vaccination. \par \par Follow up annually or sooner if new cardiac symptoms arise.

## 2022-02-15 ENCOUNTER — TRANSCRIPTION ENCOUNTER (OUTPATIENT)
Age: 47
End: 2022-02-15

## 2022-02-16 ENCOUNTER — EMERGENCY (EMERGENCY)
Facility: HOSPITAL | Age: 47
LOS: 1 days | Discharge: HOME | End: 2022-02-16
Attending: EMERGENCY MEDICINE
Payer: MEDICAID

## 2022-02-16 DIAGNOSIS — S82.90XA UNSPECIFIED FRACTURE OF UNSPECIFIED LOWER LEG, INITIAL ENCOUNTER FOR CLOSED FRACTURE: Chronic | ICD-10-CM

## 2022-02-16 PROCEDURE — 99284 EMERGENCY DEPT VISIT MOD MDM: CPT

## 2022-02-16 PROCEDURE — 90715 TDAP VACCINE 7 YRS/> IM: CPT

## 2022-02-16 PROCEDURE — 12002 RPR S/N/AX/GEN/TRNK2.6-7.5CM: CPT | Mod: GC

## 2022-02-16 PROCEDURE — 12032 INTMD RPR S/A/T/EXT 2.6-7.5: CPT

## 2022-02-16 PROCEDURE — 90471 IMMUNIZATION ADMIN: CPT

## 2022-02-16 PROCEDURE — 99282 EMERGENCY DEPT VISIT SF MDM: CPT | Mod: 25

## 2022-02-16 PROCEDURE — 99283 EMERGENCY DEPT VISIT LOW MDM: CPT | Mod: 25

## 2022-02-16 RX ORDER — TETANUS TOXOID, REDUCED DIPHTHERIA TOXOID AND ACELLULAR PERTUSSIS VACCINE, ADSORBED 5; 2.5; 8; 8; 2.5 [IU]/.5ML; [IU]/.5ML; UG/.5ML; UG/.5ML; UG/.5ML
0.5 SUSPENSION INTRAMUSCULAR ONCE
Refills: 0 | Status: COMPLETED | OUTPATIENT
Start: 2022-02-16 | End: 2022-02-16

## 2022-02-16 RX ORDER — OXYCODONE AND ACETAMINOPHEN 5; 325 MG/1; MG/1
1 TABLET ORAL ONCE
Refills: 0 | Status: DISCONTINUED | OUTPATIENT
Start: 2022-02-16 | End: 2022-02-16

## 2022-02-16 RX ADMIN — OXYCODONE AND ACETAMINOPHEN 1 TABLET(S): 5; 325 TABLET ORAL at 21:00

## 2022-02-16 RX ADMIN — Medication 1 TABLET(S): at 20:33

## 2022-02-16 RX ADMIN — TETANUS TOXOID, REDUCED DIPHTHERIA TOXOID AND ACELLULAR PERTUSSIS VACCINE, ADSORBED 0.5 MILLILITER(S): 5; 2.5; 8; 8; 2.5 SUSPENSION INTRAMUSCULAR at 20:33

## 2022-02-16 NOTE — ED PROVIDER NOTE - PATIENT PORTAL LINK FT
You can access the FollowMyHealth Patient Portal offered by [facility] by registering at the following website: [email or web address]. By joining [employer]’s FollowMyHealth portal, you will also be able to view your health information using other applications (apps) compatible with our system.
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
## 2022-02-16 NOTE — ED PROVIDER NOTE - CARE PROVIDER_API CALL
Lauryn Terrell)  Plastic Surgery; Surgery  [address] [facility]  [address] [place] [address]  Phone: [phone]  Fax: [phone]  Follow Up Time: 4-6 Days

## 2022-02-16 NOTE — ED PROVIDER NOTE - PROGRESS NOTE DETAILS
Nik CHUN: Ear repaired by Dr Terrell (Plastics). Dressing material and instructions for wound care given to patient. Will follow with Dr Terrell in one week. Return precautions given.

## 2022-02-16 NOTE — ED PROVIDER NOTE - NSFOLLOWUPINSTRUCTIONS_ED_ALL_ED_FT
Followup with Dr. Terrell in 1 week for evaluation of your injuries. Please take Augmentin 875 mg twice a day for ten days. Return to the ER should your condition worsen.     Crush Injury    WHAT YOU NEED TO KNOW:    A crush injury happens when part of your body is trapped under a heavy object, or trapped between objects. You may have one or more broken bones. You may also have tissue damage. The damage can cause pain, numbness, and weakness. A crush injury can cause serious problems that need immediate treatment.    DISCHARGE INSTRUCTIONS:    Medicines: You may need any of the following:   •Prescription pain medicine may be given. Ask how to take this medicine safely.      •Antibiotics prevent or fight a bacterial infection.      •Take your medicine as directed. Contact your healthcare provider if you think your medicine is not helping or if you have side effects. Tell him of her if you are allergic to any medicine. Keep a list of the medicines, vitamins, and herbs you take. Include the amounts, and when and why you take them. Bring the list or the pill bottles to follow-up visits. Carry your medicine list with you in case of an emergency.      Call 911 for any of the following:   •You have chest pain, shortness of breath, or cannot think clearly.          Return to the emergency department if:   •The skin near the injured area turns blue or white or feels cold and numb.      •You feel pain that increases when you stretch or bend the injured area.      •The injured area swells or feels tight or hard.      •You have pale or shiny skin near your injury.      •You have numbness or trouble moving your injured arm or leg.      •Your wound is draining pus or smells bad.      •Your pain or swelling does not go away or gets worse, even after you take medicine.      •Blood soaks through your bandage or cast.      Contact your healthcare provider if:   •You have questions or concerns about your condition or care.          Follow up with your healthcare provider as directed: You may need more x-rays, or a cast for a broken bone. You may also need treatment for muscle, nerve, or kidney damage. Your healthcare provider may refer you to an orthopedic surgeon or other specialist. Write down your questions so you remember to ask them during your visits.    Apply ice: Ice helps decrease pain and swelling. Ice may also help decrease tissue damage. Use an ice pack, or put crushed ice in a plastic bag. Cover it with a towel. Apply it to the injured area for 20 minutes every hour, or as directed. Ask your healthcare provider how many times each day to apply ice, and for how many days.    Elevate the injured area as directed: If possible, raise the area as often as you can. This will help decrease swelling and pain. Prop it on pillows to keep it elevated comfortably.     Do not smoke: Smoking can cause tissue damage and delay healing. Ask your healthcare provider for more information if you currently smoke and need help quitting.    Go to therapy as directed: A physical therapist can teach you exercises to help improve movement and strength. Physical therapy can also help decrease pain and loss of function. An occupational therapist can help you find ways to do daily activities and care for yourself.
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
## 2022-02-16 NOTE — ED PROVIDER NOTE - ATTENDING CONTRIBUTION TO CARE
Nik: I performed a face to face bedside interview with patient regarding history of present illness, review of symptoms and past medical history. I completed an independent physical exam and ordered tests/medications as needed.  I have discussed patient's plan of care with the resident. The resident assisted in  executing the discussed plan. I was available for any questions or issues that may have arose during the execution of the plan of care.
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
## 2022-12-09 NOTE — DISCUSSION/SUMMARY
[FreeTextEntry1] : 46M h/o obesity, reported valvular regurgitation last seen cardiologist >5 years ago presented to [facility] on 7/10/21 with acute dyspnea awoke from sleep, denies any chest pain, EKGs found incidental 1st degree and Mobitz I, no prior syncope, baseline active with exercise, reports chronic dyspnea for several years, intermittent LE swelling worst on dependent standing/sitting. TTE with normal LV systolic function/EF, no significant valvular abnormality, mildly dilated RV, negative D-dimer, suspect due to undiagnosed RENETTA, would need outpatient sleep study and also Event monitor, presents for initial outpatient cardiology visit seen on 7/2021 Holter noted asymptomatic Mobitz 1 AV block with <3 seconds pause, last visit 9/2021, exercise treadmill stress test for 10 minutes, no chest pain/ECG abnormality with peak  bpm, repeat TTE with LV EF 55%, mild LVH, mildly enlarged RV size with PASP 28 mmHg, mild biatrial enlargement,presents for follow up. \par \par EKG with 1st degree AV block and prior Mobitz type 1 appear stable, mild RV dysfunction and features of sleep apnea suspect RENETTA, need to track down result of sleep study done at outside facility\par \par \par 1. Mild RV dysfunction- await sleep study result if indicated for CPAP. \par \par 2. Mobitz I AV block, prior syncope- stable, carotid Duplex without stenosis/atherosclerosis. \par \par 3. Dyspnea- resolved, exercise treadmill stress low cardiac risk. \par \par 4. Obesity, HCM- to establish care with PMD, need lab on A1c and fasting lipid. \par \par \par Strongly advised to obtain COVID vaccination. \par \par Follow up annually or sooner if new cardiac symptoms arise.

## 2022-12-09 NOTE — CARDIOLOGY SUMMARY
[de-identified] : 7/13/21- Sinus 52, normal axis, Mobitz I, nonspecific T-wave, QTc 416\par 12/13/21- Sinus 62, normal axis, 1st degree, QTc 423 [de-identified] : 9/2021: Exercise treadmill stress test for 10 minutes, no chest pain/ECG abnormality with peak  bpm [de-identified] : 7/10/21- Summary:\par  1. Left ventricular ejection fraction, by visual estimation, is 55 to 60%.\par  2. Normal global left ventricular systolic function.\par  3. Moderately enlarged right ventricle.\par  4. Mildly enlarged right atrium.\par  5. Normal left atrial size.\par  6. Mild mitral annular calcification.\par  7. Thickening of the anterior and posterior mitral valve leaflets.\par  8. Trace mitral valve regurgitation.\par  9. Mild-moderate tricuspid regurgitation.\par \par 9/2021: TTE with LV EF 55%, mild LVH, mildly enlarged RV size with PASP 28 mmHg, mild biatrial enlargement

## 2022-12-12 ENCOUNTER — APPOINTMENT (OUTPATIENT)
Dept: CARDIOLOGY | Facility: CLINIC | Age: 47
End: 2022-12-12

## 2022-12-23 ENCOUNTER — EMERGENCY (EMERGENCY)
Facility: HOSPITAL | Age: 47
LOS: 1 days | Discharge: HOME | End: 2022-12-23
Attending: EMERGENCY MEDICINE
Payer: MEDICAID

## 2022-12-23 VITALS
HEART RATE: 78 BPM | OXYGEN SATURATION: 97 % | TEMPERATURE: 98 F | DIASTOLIC BLOOD PRESSURE: 99 MMHG | RESPIRATION RATE: 16 BRPM | SYSTOLIC BLOOD PRESSURE: 155 MMHG

## 2022-12-23 DIAGNOSIS — S82.90XA UNSPECIFIED FRACTURE OF UNSPECIFIED LOWER LEG, INITIAL ENCOUNTER FOR CLOSED FRACTURE: Chronic | ICD-10-CM

## 2022-12-23 PROCEDURE — 93010 ELECTROCARDIOGRAM REPORT: CPT

## 2022-12-23 PROCEDURE — 99285 EMERGENCY DEPT VISIT HI MDM: CPT

## 2022-12-23 RX ORDER — FAMOTIDINE 10 MG/ML
20 INJECTION INTRAVENOUS ONCE
Refills: 0 | Status: COMPLETED | OUTPATIENT
Start: 2022-12-23 | End: 2022-12-23

## 2022-12-23 NOTE — ED ADULT TRIAGE NOTE - BP NONINVASIVE SYSTOLIC (MM HG)
Post bariatric surgery  Patient has not had a workup since then  Will refer to weight management  Will get labs  155

## 2022-12-23 NOTE — ED ADULT TRIAGE NOTE - CHIEF COMPLAINT QUOTE
Pt. complaining of left chest acheiness "every 3 or 4 breaths" since 10 am, stopped 1 hour PTA. Pt. denies pain at this time.

## 2022-12-23 NOTE — ED ADULT TRIAGE NOTE - AS TEMP SITE
Plan: . \\nPatch testing. If symptoms come back. Render In Strict Bullet Format?: No Detail Level: Zone Initiate Treatment: .\\n\\nAM\\n\\n- Take Zyrtec 10mg every morning \\n- triamcinolone acetonide 0.1 % topical cream Apply to affected areas on body twice a day for two weeks. \\n\\n\\nPM\\n\\n- Take Benadryl 25mg once a day at night time\\n- triamcinolone acetonide 0.1 % topical cream Apply to affected areas on body twice a day for two weeks. Initiate Treatment: . \\nUnscented Moisturizering cream twice a day.  ( Cerave or Cetaphil moisturizing cream) oral

## 2022-12-24 PROBLEM — Z78.9 OTHER SPECIFIED HEALTH STATUS: Chronic | Status: ACTIVE | Noted: 2022-02-16

## 2022-12-24 LAB
ALBUMIN SERPL ELPH-MCNC: 3.8 G/DL — SIGNIFICANT CHANGE UP (ref 3.3–5.2)
ALP SERPL-CCNC: 67 U/L — SIGNIFICANT CHANGE UP (ref 40–120)
ALT FLD-CCNC: 18 U/L — SIGNIFICANT CHANGE UP
ANION GAP SERPL CALC-SCNC: 9 MMOL/L — SIGNIFICANT CHANGE UP (ref 5–17)
APTT BLD: 30.5 SEC — SIGNIFICANT CHANGE UP (ref 27.5–35.5)
AST SERPL-CCNC: 16 U/L — SIGNIFICANT CHANGE UP
BASOPHILS # BLD AUTO: 0.07 K/UL — SIGNIFICANT CHANGE UP (ref 0–0.2)
BASOPHILS NFR BLD AUTO: 1.1 % — SIGNIFICANT CHANGE UP (ref 0–2)
BILIRUB SERPL-MCNC: 0.2 MG/DL — LOW (ref 0.4–2)
BUN SERPL-MCNC: 12.7 MG/DL — SIGNIFICANT CHANGE UP (ref 8–20)
CALCIUM SERPL-MCNC: 9 MG/DL — SIGNIFICANT CHANGE UP (ref 8.4–10.5)
CHLORIDE SERPL-SCNC: 104 MMOL/L — SIGNIFICANT CHANGE UP (ref 96–108)
CO2 SERPL-SCNC: 27 MMOL/L — SIGNIFICANT CHANGE UP (ref 22–29)
CREAT SERPL-MCNC: 0.95 MG/DL — SIGNIFICANT CHANGE UP (ref 0.5–1.3)
EGFR: 99 ML/MIN/1.73M2 — SIGNIFICANT CHANGE UP
EOSINOPHIL # BLD AUTO: 0.3 K/UL — SIGNIFICANT CHANGE UP (ref 0–0.5)
EOSINOPHIL NFR BLD AUTO: 4.5 % — SIGNIFICANT CHANGE UP (ref 0–6)
GLUCOSE SERPL-MCNC: 107 MG/DL — HIGH (ref 70–99)
HCT VFR BLD CALC: 43.5 % — SIGNIFICANT CHANGE UP (ref 39–50)
HGB BLD-MCNC: 14.3 G/DL — SIGNIFICANT CHANGE UP (ref 13–17)
IMM GRANULOCYTES NFR BLD AUTO: 0.5 % — SIGNIFICANT CHANGE UP (ref 0–0.9)
INR BLD: 0.96 RATIO — SIGNIFICANT CHANGE UP (ref 0.88–1.16)
LIDOCAIN IGE QN: 41 U/L — SIGNIFICANT CHANGE UP (ref 22–51)
LYMPHOCYTES # BLD AUTO: 2.84 K/UL — SIGNIFICANT CHANGE UP (ref 1–3.3)
LYMPHOCYTES # BLD AUTO: 42.6 % — SIGNIFICANT CHANGE UP (ref 13–44)
MAGNESIUM SERPL-MCNC: 2.1 MG/DL — SIGNIFICANT CHANGE UP (ref 1.6–2.6)
MCHC RBC-ENTMCNC: 28.7 PG — SIGNIFICANT CHANGE UP (ref 27–34)
MCHC RBC-ENTMCNC: 32.9 GM/DL — SIGNIFICANT CHANGE UP (ref 32–36)
MCV RBC AUTO: 87.2 FL — SIGNIFICANT CHANGE UP (ref 80–100)
MONOCYTES # BLD AUTO: 0.63 K/UL — SIGNIFICANT CHANGE UP (ref 0–0.9)
MONOCYTES NFR BLD AUTO: 9.5 % — SIGNIFICANT CHANGE UP (ref 2–14)
NEUTROPHILS # BLD AUTO: 2.79 K/UL — SIGNIFICANT CHANGE UP (ref 1.8–7.4)
NEUTROPHILS NFR BLD AUTO: 41.8 % — LOW (ref 43–77)
NT-PROBNP SERPL-SCNC: 6 PG/ML — SIGNIFICANT CHANGE UP (ref 0–300)
PLATELET # BLD AUTO: 186 K/UL — SIGNIFICANT CHANGE UP (ref 150–400)
POTASSIUM SERPL-MCNC: 3.8 MMOL/L — SIGNIFICANT CHANGE UP (ref 3.5–5.3)
POTASSIUM SERPL-SCNC: 3.8 MMOL/L — SIGNIFICANT CHANGE UP (ref 3.5–5.3)
PROT SERPL-MCNC: 6.7 G/DL — SIGNIFICANT CHANGE UP (ref 6.6–8.7)
PROTHROM AB SERPL-ACNC: 11.1 SEC — SIGNIFICANT CHANGE UP (ref 10.5–13.4)
RBC # BLD: 4.99 M/UL — SIGNIFICANT CHANGE UP (ref 4.2–5.8)
RBC # FLD: 13.9 % — SIGNIFICANT CHANGE UP (ref 10.3–14.5)
SODIUM SERPL-SCNC: 140 MMOL/L — SIGNIFICANT CHANGE UP (ref 135–145)
TROPONIN T SERPL-MCNC: <0.01 NG/ML — SIGNIFICANT CHANGE UP (ref 0–0.06)
WBC # BLD: 6.66 K/UL — SIGNIFICANT CHANGE UP (ref 3.8–10.5)
WBC # FLD AUTO: 6.66 K/UL — SIGNIFICANT CHANGE UP (ref 3.8–10.5)

## 2022-12-24 PROCEDURE — 85730 THROMBOPLASTIN TIME PARTIAL: CPT

## 2022-12-24 PROCEDURE — 85610 PROTHROMBIN TIME: CPT

## 2022-12-24 PROCEDURE — 96374 THER/PROPH/DIAG INJ IV PUSH: CPT

## 2022-12-24 PROCEDURE — 93005 ELECTROCARDIOGRAM TRACING: CPT

## 2022-12-24 PROCEDURE — 80053 COMPREHEN METABOLIC PANEL: CPT

## 2022-12-24 PROCEDURE — 36415 COLL VENOUS BLD VENIPUNCTURE: CPT

## 2022-12-24 PROCEDURE — 83735 ASSAY OF MAGNESIUM: CPT

## 2022-12-24 PROCEDURE — 71045 X-RAY EXAM CHEST 1 VIEW: CPT

## 2022-12-24 PROCEDURE — 85025 COMPLETE CBC W/AUTO DIFF WBC: CPT

## 2022-12-24 PROCEDURE — 99285 EMERGENCY DEPT VISIT HI MDM: CPT | Mod: 25

## 2022-12-24 PROCEDURE — 83880 ASSAY OF NATRIURETIC PEPTIDE: CPT

## 2022-12-24 PROCEDURE — 83690 ASSAY OF LIPASE: CPT

## 2022-12-24 PROCEDURE — 71045 X-RAY EXAM CHEST 1 VIEW: CPT | Mod: 26

## 2022-12-24 PROCEDURE — 84484 ASSAY OF TROPONIN QUANT: CPT

## 2022-12-24 RX ADMIN — FAMOTIDINE 20 MILLIGRAM(S): 10 INJECTION INTRAVENOUS at 00:22

## 2022-12-24 NOTE — ED PROVIDER NOTE - CLINICAL SUMMARY MEDICAL DECISION MAKING FREE TEXT BOX
48 y/o male with hx of Mobitz type 1, valvular regurgitation presents to the ED c/o intermittent left sided chest pain since yesterday. chest pain intermittent since yesterday, chest pain free at this time, labs, ekg nsr, Pt reassessed, pt feeling better at this time, vss, pt able to walk, talk and vocalized plan of action. Discussed in depth and explained to pt in depth the next steps that need to be taking including proper follow up with PCP or specialists. All incidental findings were discussed with pt as well. Pt verbalized their concerns and all questions were answered. Pt understands dispo and wants discharge. Given good instructions when to return to ED and importance of f/u.

## 2022-12-24 NOTE — ED PROVIDER NOTE - CARE PROVIDER_API CALL
Alex Hallman (DO)  Cardiovascular Disease; Internal Medicine  [address] [place] [address]  Phone: [phone]  Fax: [phone]  Follow Up Time:

## 2022-12-24 NOTE — ED PROVIDER NOTE - PATIENT PORTAL LINK FT
You can access the FollowMyHealth Patient Portal offered by [facility] by registering at the following website: [email or web address]. By joining [employer]’s FollowMyHealth portal, you will also be able to view your health information using other applications (apps) compatible with our system.

## 2022-12-24 NOTE — ED ADULT NURSE NOTE - OBJECTIVE STATEMENT
c/o chest pain "all day". states it is on the L side. started at 10am. c/o chest pain "all day". states it is on the L side. started at 10am. daily pot smoker. states he drank red wine tonight to see if that would help with pain. pain stopped PTA.

## 2022-12-24 NOTE — ED PROVIDER NOTE - NS ED ATTENDING STATEMENT MOD
Attending with This was a shared visit with the МАРИЯ. I reviewed and verified the documentation and independently performed the documented:

## 2022-12-24 NOTE — ED PROVIDER NOTE - OBJECTIVE STATEMENT
46 y/o male with hx of Mobitz type 1, valvular regurgitation presents to the ED c/o intermittent left sided chest pain since yesterday. Intermittent left sided chest pain. comes and goes. started yesterday around 9 am. not worsening with food intake. Denies fevers, chills, cough. No recent travels, no recent surgeries. Hx of smoking. Pt was able to eat without issues.

## 2022-12-24 NOTE — ED ADULT NURSE NOTE - ALCOHOL PRE SCREEN (AUDIT - C)
Statement Selected Bi-Rhombic Flap Text: The defect edges were debeveled with a #15 scalpel blade.  Given the location of the defect and the proximity to free margins a bi-rhombic flap was deemed most appropriate.  Using a sterile surgical marker, an appropriate rhombic flap was drawn incorporating the defect. The area thus outlined was incised deep to adipose tissue with a #15 scalpel blade.  The skin margins were undermined to an appropriate distance in all directions utilizing iris scissors.

## 2022-12-24 NOTE — ED PROVIDER NOTE - ATTENDING APP SHARED VISIT CONTRIBUTION OF CARE
patient with intermittent chest pain today  non exertional  smokes marijuana daily  comfortable at this time  EKG NSR  will check trop, f/u with cards

## 2022-12-24 NOTE — ED ADULT NURSE NOTE - NS ED NURSE LEVEL OF CONSCIOUSNESS MENTAL STATUS
71 yo male PMH CAD s/o CABG years ago, HTN, elevated cholesterol BPH, hypothyroidism c/o constipation for 4 days.  Patient reports eating a large container of hard cheese on Friday, that was also the day of his last BM.,  In the past cheese ingestion has given him constipation .  Reports passing small amt of hard and liquidy stools 2 days ago, he also had transient abdominal pain a few days ago which has resolved since.  Denies any recent illness, unintentional weight loss, fever, chills flank or back pain, no urinary complaints , no black or bloody stools or any other concerning symptoms.   Well-appearing well-nourished elderly male in  NAD, head AT/NC, PERRL, pink conjunctivae,  mmm, nml oropharynx, nml phonation without drooling or trismus, supple neck without midline spine ttp, nml work of breathing, lungs CTA b/l, equal air entry, RRR, well-perfused extremities, distal pulses intact, abdomen soft, NT/ND, BS present in all quadrants, no midline spine or CVA ttp, no leg edema or unilateral calf swelling, A&Ox3, no focal neuro deficits, nml mood and affect, KONSTANTIN performed by MARYAM Ledezma and reported as negative for blood /masses or stool.  Abdominal x-ray shows non-obstructive bowel pattern, patient was prescribed Mag citrate , stable for d/c home, strict return precautions given. Awake/Alert

## 2024-05-10 NOTE — ED ADULT NURSE NOTE - CAS DISCH ACCOMP BY
Problem: Skin  Goal: Decreased wound size/increased tissue granulation at next dressing change  Outcome: Progressing  Goal: Participates in plan/prevention/treatment measures  Outcome: Progressing  Goal: Prevent/manage excess moisture  Outcome: Progressing  Goal: Prevent/minimize sheer/friction injuries  Outcome: Progressing  Goal: Promote/optimize nutrition  Outcome: Progressing  Goal: Promote skin healing  Outcome: Progressing     Problem: Pain  Goal: My pain/discomfort is manageable  Outcome: Progressing     Problem: Safety  Goal: Patient will be injury free during hospitalization  Outcome: Progressing  Goal: I will remain free of falls  Outcome: Progressing   The patient's goals for the shift include  comfort/rest    The clinical goals for the shift include Patient will remain comfortable throughout shift     Self

## 2025-03-03 NOTE — ED ADULT NURSE NOTE - SUICIDE SCREENING QUESTION 2
Radiation Oncology Treatment Summary    Patient Name:  Jovon Mccartney  MRN:  [id number]  :  1975    Referring Provider: No ref. provider found  Primary Care Provider: ADELINA Hernandez    Brief History: Jovon Mccartney is a 49 y.o. male with NSCLC metastatic to bone (Multi), Clinical: Stage IVB (cT4, cN3, cM1c).  The patient completed radiotherapy as outlined below.    Radiation Treatment Summary:    3D CRT: Right Scalp, Right Scapula, Not Applicable Cervical spine    Treatment Period Technique Fraction Dose Fractions Total Dose   Course 1 10/10/2024-10/17/2024  (days elapsed: 7)         C_Spine 10/10/2024-10/17/2024 3D 400 / 400 cGy 5 / 5 2000 / 2,000 cGy         R_Scalp 10/10/2024-10/17/2024 3D 400 / 400 cGy 5 / 5 2000 / 2,000 cGy         R_Shoulder 10/10/2024-10/17/2024 3D 400 / 400 cGy 5 / 5 2000 / 2,000 cGy      Radiation Therapy    Treatment Period Technique Fraction Dose Fractions Total Dose   Course 2 2025-2025  (days elapsed: 8)         Brain_R 2025-2025 SBRT 600 / 600 cGy 5 / 5 3000 / 3,000 cGy         R_Hip 2025-2025 3-Field 400 / 400 cGy 5 / 5 2000 / 2,000 cGy       Please see the patient's [facility] chart for further details regarding the radiation plan, including beam energy.    Concurrent Chemotherapy:  Treatment Plans       Name Type Plan Dates Plan Provider         Active    Pembrolizumab + PEMEtrexed, 21 Day Cycles  Oncology Treatment 2025 - Present Liset Vargas MD                    CTCAE Toxicity Overview:   Toxicity Assessment          2025    13:00   Toxicity Assessment   Adverse Events Reviewed (WDL) Yes (Within Defined Limits)   Treatment Site Bone   Anorexia Grade 1       fair appetite   Dehydration Grade 0   Dermatitis Radiation Grade 0   Fatigue Grade 1   Pain Grade 1       MS contin BID with oxycodone prn/ moderate relief.   Bone Pain Grade 1       parminder arms. upper back and shoulders     Patient Disposition: Follow-up scheduled with repeat  imaging on May 1, 2025.           No

## 2025-05-23 NOTE — ED PROVIDER NOTE - NS ED MD DISPO DISCHARGE
Pt arrived ambulatory to triage with her mom for severe lower abdominal pain. Pt is also 6.2 weeks pregnant.     Per pt, she began having \"period type symptoms\" a few weeks ago and then mild cramping that would come and go. Pt was seen at [facility] yesterday, 5/22/2025 and had an ultrasound that showed a single sac with a fetal heart rate of 136BPM.     Pt was discharged from [place] and had an appointment scheduled with her OB/GYN in [place], [place] for next week but pt was going to attempt to be seen sooner due to abdominal pains not getting better.     @ approx 0310 pt began having severe abdominal pain on both left and right side of her abdomen. Pt immediately came to ED.     Pt is A&O X 4. Pt does have a history of MS. Pt wheeled to main side ED.    Home